# Patient Record
Sex: MALE | URBAN - METROPOLITAN AREA
[De-identification: names, ages, dates, MRNs, and addresses within clinical notes are randomized per-mention and may not be internally consistent; named-entity substitution may affect disease eponyms.]

---

## 2017-06-01 ENCOUNTER — HISTORICAL (OUTPATIENT)
Dept: ADMINISTRATIVE | Facility: HOSPITAL | Age: 52
End: 2017-06-01

## 2022-04-10 ENCOUNTER — HISTORICAL (OUTPATIENT)
Dept: ADMINISTRATIVE | Facility: HOSPITAL | Age: 57
End: 2022-04-10

## 2022-04-27 VITALS
HEIGHT: 71 IN | DIASTOLIC BLOOD PRESSURE: 98 MMHG | WEIGHT: 163.56 LBS | SYSTOLIC BLOOD PRESSURE: 169 MMHG | BODY MASS INDEX: 22.9 KG/M2

## 2022-04-30 NOTE — PROGRESS NOTES
"   Patient:   Javon Heller             MRN: [id number]            FIN: [id number]               Age:   51 years     Sex:  Male     :  1965   Associated Diagnoses:   Impingement syndrome of right shoulder; Arthritis of right acromioclavicular joint; Tobacco abuse counseling; HTN (hypertension)   Author:   Musa Ruiz NP      Chief Complaint   2017 10:13 CDT       Referred for Rt. shoulder        History of Present Illness   50 yo AA male presents to ortho clinic for c/o right shoulder pain.  Reports that the pain to right shoulder started "several months ago" but denies any known injury or trauma.  Reports that any upward or overhead movement  makes the pain worse.  Denies any numbness/tingling to right UE.  Denies ever receiving shoulder injections for symptom relief.  Admits taking prescribed medication as needed for pain with moderate relief.  Current daily smoker.  No other complaints today.      Review of Systems   ROS reviewed as documented in chart      Health Status   Allergies:    Allergic Reactions (Selected)  No Known Medication Allergies,    Allergies (1) Active Reaction  No Known Medication Allergies None Documented     Current medications:  (Selected)   Documented Medications  Documented  CYCLOBENZAPR TAB 10MG: 10 mg = 1 tab(s), Oral, TID  GABAPENTIN   CAP 300M mg = 1 cap(s), Oral, TID  IBUPROFEN    TAB 800MG: mg tab(s), Oral  LISINOPRIL   TAB 10MG: 10 mg = 1 tab(s), Oral, Daily  Vit D2 1.25 Mg (50,000 Unit): 56528 International unit = 1 cap(s), Oral, qWeek,    Home Medications (5) Active  CYCLOBENZAPR TAB 10MG 10 mg = 1 tab(s), Oral, TID  GABAPENTIN   CAP 300MG 300 mg = 1 cap(s), Oral, TID  IBUPROFEN    TAB 800MG , Oral  LISINOPRIL   TAB 10MG 10 mg = 1 tab(s), Oral, Daily  Vit D2 1.25 Mg (50,000 Unit) 46978 International unit = 1 cap(s), Oral, qWeek  ,    No qualifying data available     Problem list:    All Problems  Anxiety / SNOMED CT 56914233 / Confirmed  Hypertension / " SNOMED CT 27992639 / Confirmed  Tobacco user / SNOMED CT 801130326 / Probable,    Active Problems (3)  Anxiety   Hypertension   Tobacco user         Histories   Past Medical History:    No active or resolved past medical history items have been selected or recorded.   Family History:    No family history items have been selected or recorded.   Procedure history:    No active procedure history items have been selected or recorded.   Social History        Social & Psychosocial Habits    Tobacco  06/01/2017  Use: Current every day smoker    Type: Cigarettes  .        Physical Examination   Vital Signs   6/1/2017 10:13 CDT       Peripheral Pulse Rate     89 bpm                             Systolic Blood Pressure   169 mmHg  HI                             Diastolic Blood Pressure  98 mmHg  HI     Measurements from flowsheet : Measurements   6/1/2017 10:13 CDT       Weight Dosing             74.2 kg                             Weight Measured           74.2 kg                             Weight Measured and Calculated in Lbs     163.58 lb                             Height/Length Dosing      180.34 cm                             Height/Length Measured    180.34 cm                             BSA Measured              1.93 m2                             Body Mass Index Measured  22.81 kg/m2     General:  Alert and oriented.    HENT:  Normocephalic.    Neck:  Supple.    Integumentary:  Warm, Dry, Pink.    Neurologic:  No focal deficits.    Cognition and Speech:  Speech clear and coherent.    Psychiatric:  Appropriate mood & affect.    Left shoulder exam:  Limited ROM with increased pain associated with upward/lateral movement.  TTP to supraspinatus tendon and AC joint.  Supraspinatus stress (+).  Drop arm (-).  Ruiz (+).  Neer (+).  NV intact.  No swelling or gross deformity noted.  +2 radial pulse.  FF = 140.  ER = 50.  IR = L-spine.  Abduction = 100.  No periscapular atrophy noted.         Health Maintenance       Health Maintenance     Pending (in the next year)        Due            Alcohol Misuse Screening due  06/01/17  and every 1  year(s)           Aspirin Therapy for CVD Prevention due  06/01/17  and every 1  year(s)           Colorectal Screening due  06/01/17  Variable frequency           Depression Screening due  06/01/17  and every 1  year(s)           Diabetes Screening due  06/01/17  Variable frequency           HIV Screening due  06/01/17  and every 1  year(s)           Hypertension Management-Education due  06/01/17  and every 1  year(s)           Hypertension Management-BMP due  06/01/17  Variable frequency           Lipid Screening due  06/01/17  Variable frequency           Tetanus Vaccine due  06/01/17  and every 10  year(s)        Due In Future            Influenza Vaccine not due until  10/02/17  and every 1  year(s)           Hypertension Management-Blood Pressure not due until  12/01/17  and every 6  month(s)     Satisfied (in the past 1 year)        Satisfied            Blood Pressure Screening on  06/01/17.  Satisfied by Charlene Pickens LPN           Body Mass Index Check on  06/01/17.  Satisfied by Charlene Pickens LPN           Hypertension Management-Blood Pressure on  06/01/17.  Satisfied by Charlene Pickens LPN           Obesity Screening on  06/01/17.  Satisfied by Charlene Pickens LPN           Smoking Cessation on  06/01/17.  Satisfied by Charlene Pickens LPN           Tobacco Use Screening on  06/01/17.  Satisfied by Charlene Pickens LPN          Review / Management   Results review:     No qualifying data available.             Impression and Plan   Diagnosis     Impingement syndrome of right shoulder ([id number] M75.41).     Arthritis of right acromioclavicular joint (ZDI15-AS M19.90).     Tobacco abuse counseling (UGA66-ZK Z71.6).     HTN (hypertension) (NES45-OB I10).       Plan:  1.  Denies wanting to try a shoulder injection today for symptom relief.  2.   ROM/strengthening exercises to right UE (printed instructions and exercise bands given to pt.).  3.  Continue prescribed medication as needed for pain relief.  4.  Ice compresses and activity modifications as needed for symptom relief.  5.  Discussed risks of smoking and pt. does not want to quit at this time.  6.  Reports that his PCP monitors and treats his HTN.  7.  RTC PRN.      Patient Instructions:  Impingement Syndrome, Rotator Cuff, Bursitis With Rehab-SportsMed, Steps to Quit Smoking.

## 2024-09-25 ENCOUNTER — HOSPITAL ENCOUNTER (OUTPATIENT)
Dept: WOUND CARE | Facility: HOSPITAL | Age: 59
Discharge: HOME | End: 2024-09-25
Attending: EMERGENCY MEDICINE
Payer: MEDICARE

## 2024-09-25 VITALS
SYSTOLIC BLOOD PRESSURE: 169 MMHG | RESPIRATION RATE: 20 BRPM | BODY MASS INDEX: 15.68 KG/M2 | HEART RATE: 77 BPM | WEIGHT: 112 LBS | HEIGHT: 71 IN | DIASTOLIC BLOOD PRESSURE: 92 MMHG | TEMPERATURE: 98 F

## 2024-09-25 PROCEDURE — 27000999 HC MEDICAL RECORD PHOTO DOCUMENTATION

## 2024-09-25 RX ORDER — CHLOPHEDIANOL HCL AND PYRILAMINE MALEATE 12.5; 12.5 MG/5ML; MG/5ML
10 SOLUTION ORAL EVERY 6 HOURS PRN
COMMUNITY
Start: 2024-09-17

## 2024-09-25 RX ORDER — CYCLOBENZAPRINE HCL 5 MG
5 TABLET ORAL NIGHTLY
COMMUNITY
Start: 2024-05-13

## 2024-09-25 RX ORDER — CETIRIZINE HYDROCHLORIDE 10 MG/1
10 TABLET ORAL DAILY
COMMUNITY
Start: 2021-07-07

## 2024-09-25 RX ORDER — BENZONATATE 200 MG/1
200 CAPSULE ORAL 3 TIMES DAILY
COMMUNITY
Start: 2024-05-14

## 2024-09-25 RX ORDER — PREDNISONE 20 MG/1
20 TABLET ORAL DAILY
COMMUNITY
Start: 2024-09-17

## 2024-09-25 RX ORDER — ALBUTEROL SULFATE 90 UG/1
1 INHALANT RESPIRATORY (INHALATION) EVERY 4 HOURS PRN
COMMUNITY
Start: 2024-09-17

## 2024-09-25 RX ORDER — CYPROHEPTADINE HYDROCHLORIDE 4 MG/1
4 TABLET ORAL 2 TIMES DAILY PRN
COMMUNITY
Start: 2024-09-17 | End: 2025-02-13

## 2024-09-25 RX ORDER — CIPROFLOXACIN 500 MG/1
500 TABLET ORAL EVERY 12 HOURS
COMMUNITY
Start: 2024-09-17

## 2024-09-25 RX ORDER — LOSARTAN POTASSIUM 50 MG/1
50 TABLET ORAL DAILY
COMMUNITY
Start: 2021-07-07

## 2024-09-25 NOTE — PATIENT INSTRUCTIONS
Pt seen today by: Dr. Debra Felix\    Self care DRESSING INSTRUCTIONS:        Wound location: Right and left feet    Cleanse feet with soap and water  Apply a piece of algiante silver to right great toe opened area  Cover with bandaid  Change daily or every other day  Moisturize both feet and legs  Apply tubigrip size D to both lower legs       Compression with: Tuibigrip size D    Return visit: October 9, 2024 at 10:00 am call us at [phone] with any questions or concerns      Wound may have been debrided in clinic: if so, WHAT YOU NEED TO KNOW:    Debridement is the removal of infected, damaged, or dead tissue so a wound can heal properly. Your wound may need more than one debridement. Debridement can cause bleeding, and a small amount of blood is expected.  AFTER A DEBRIDEMENT:    Keep your wound clean and dry. Do not remove the dressing unless instructed.  Follow the wound care orders provided to you or your home health care provider.  If you have pain, take over the counter pain relievers or pain medication if prescribed.  Elevate the wound and limit excessive activity to prevent bleeding and/or swelling in your wound.  If you see blood coming through the dressing, apply gauze and tape over the dressing and hold firm pressure to the wound with your hand for 5-10 minutes continuously, without peeking, to help the bleeding stop.  Contact [facility] wound care team at [phone] or go to the nearest Emergency department if:    You have a fever greater than 101 taken by mouth.  Your pain gets worse or does not go away, even after taking your regular pain medicine.  Your skin around your wound is red, hot, swollen, or draining pus.  You have bleeding that continues to come through the dressing after holding pressure for 10 minutes       Compression: You may be using a compressive type of dressings to control edema: If so, keep your compression wrap or tubigrip in place. Do not get them wet, they should feel snug. If  they feel tight, or cause pain in any way,  elevate your legs above your heart for 15 minutes. If the wraps still cause pain or you can not tolerate compression,  please remove and notify the clinic or your home health.     Nutrition:  The current daily value (%DV) for protein is 50 grams per day and is meant as a general goal for most people. Further increasing your dietary protein intake is very important for wound healing. Typically one needs over 100g of protein per day to help with wound healing needs.  If you are a dialysis patient or have problems with your kidneys, talk to your Nephrologist about how much protein you can take in with your condition.  Examples of high protein items that can be added to your diet include: eggs, chicken, red meats, almonds, cottage cheese, Greek yogurt, beans, and peanut butter.  Fortified protein bars, shakes and drinks can add 15-30 additional grams of protein per serving.   Also add:   1 daily general multivitamin   Salvador : 1 packet twice daily   Vitamin C : 500mg twice daily   Zinc 220 mg daily  Vit D : once daily    Offloading   Offload your wound. This means to reduce pressure on and around the wound that reduces blood flow to the wound and prevents healing. Your wound care team will discuss specific ways for you to offload your specific wound. Common offloading strategies include:    Turn or reposition every 2 hours or sooner  Use pillows, wedges, ROHO wheelchair cushions or other special devices like boots and shoes to lift the wound off of hard surfaces  Alternating Low Air-loss (ALAL) mattress may be ordered  Padded dressings can reduce wound pressure        Call our [facility] wound clinic for questions/concerns a [phone] .